# Patient Record
Sex: MALE | Race: BLACK OR AFRICAN AMERICAN | URBAN - METROPOLITAN AREA
[De-identification: names, ages, dates, MRNs, and addresses within clinical notes are randomized per-mention and may not be internally consistent; named-entity substitution may affect disease eponyms.]

---

## 2018-03-27 ENCOUNTER — OFFICE VISIT (OUTPATIENT)
Dept: INTERNAL MEDICINE CLINIC | Age: 23
End: 2018-03-27

## 2018-03-27 VITALS
BODY MASS INDEX: 31.58 KG/M2 | HEIGHT: 67 IN | SYSTOLIC BLOOD PRESSURE: 130 MMHG | WEIGHT: 201.2 LBS | TEMPERATURE: 98.3 F | OXYGEN SATURATION: 98 % | RESPIRATION RATE: 16 BRPM | DIASTOLIC BLOOD PRESSURE: 76 MMHG | HEART RATE: 78 BPM

## 2018-03-27 DIAGNOSIS — Z00.00 ROUTINE MEDICAL EXAM: Primary | ICD-10-CM

## 2018-03-27 DIAGNOSIS — G89.29 CHRONIC MIDLINE LOW BACK PAIN WITHOUT SCIATICA: ICD-10-CM

## 2018-03-27 DIAGNOSIS — J30.9 ALLERGIC RHINITIS, UNSPECIFIED CHRONICITY, UNSPECIFIED SEASONALITY, UNSPECIFIED TRIGGER: ICD-10-CM

## 2018-03-27 DIAGNOSIS — R19.7 DIARRHEA, UNSPECIFIED TYPE: ICD-10-CM

## 2018-03-27 DIAGNOSIS — J45.909 UNCOMPLICATED ASTHMA, UNSPECIFIED ASTHMA SEVERITY, UNSPECIFIED WHETHER PERSISTENT: ICD-10-CM

## 2018-03-27 DIAGNOSIS — M54.50 CHRONIC MIDLINE LOW BACK PAIN WITHOUT SCIATICA: ICD-10-CM

## 2018-03-27 NOTE — MR AVS SNAPSHOT
[address] [place] [address] E Debora [address]radege [address] 
[phone] Patient: Vivek Terrazas MRN: [id number] :1995 Visit Information Date & Time Provider Department Dept. Phone Encounter #  
 3/27/2018  3:00 PM Rafael Garcia, [address] [place] [address] and Internal Medicine [phone] [id number] Follow-up Instructions Return in about 2 months (around 2018) for abdomen and back pain. Upcoming Health Maintenance Date Due Influenza Age 5 to Adult 2017 Pneumococcal 19-64 Medium Risk (1 of 1 - PPSV23) 3/27/2058* DTaP/Tdap/Td series (2 - Td) 2025 *Topic was postponed. The date shown is not the original due date. Allergies as of 3/27/2018  Review Complete On: 3/27/2018 By: Rafael Garcia MD  
 No Known Allergies Current Immunizations  Reviewed on 2015 Name Date Influenza Vaccine (Quad) PF 10/29/2013 Tdap 2015 Not reviewed this visit You Were Diagnosed With   
  
 Codes Comments Routine medical exam    -  Primary ICD-10-CM: Z00.00 ICD-9-CM: V70.0 Allergic rhinitis, unspecified chronicity, unspecified seasonality, unspecified trigger     ICD-10-CM: J30.9 ICD-9-CM: 477.9 Uncomplicated asthma, unspecified asthma severity, unspecified whether persistent     ICD-10-CM: J45.909 ICD-9-CM: 493.90 Diarrhea, unspecified type     ICD-10-CM: R19.7 ICD-9-CM: 787.91 Chronic midline low back pain without sciatica     ICD-10-CM: M54.5, G89.29 ICD-9-CM: 724.2, 338.29 Vitals BP Pulse Temp Resp Height(growth percentile) Weight(growth percentile) 130/76 78 98.3 °F (36.8 °C) (Oral) 16 5' 7\" (1.702 m) 201 lb 3.2 oz (91.3 kg) SpO2 BMI Smoking Status 98% 31.51 kg/m2 Current Some Day Smoker Vitals History BMI and BSA Data Body Mass Index Body Surface Area  
 31.51 kg/m 2 2.08 m 2 Preferred Pharmacy Pharmacy Name Phone [id number] Loki [address] [place] [address] [phone] Your Updated Medication List  
  
   
This list is accurate as of 3/27/18  4:35 PM.  Always use your most recent med list.  
  
  
  
  
 * albuterol 90 mcg/actuation inhaler Commonly known as:  PROAIR HFA Take 2 Puffs by inhalation every four (4) hours as needed for Wheezing. * albuterol 90 mcg/actuation inhaler Commonly known as:  PROAIR HFA Take 2 Puffs by inhalation every four (4) hours as needed for Wheezing. ALLEGRA-D 24 HOUR 180-240 mg per tablet Generic drug:  fexofenadine-pseudoephedrine Take 1 Tab by mouth daily. budesonide-formoterol 80-4.5 mcg/actuation Hfaa Commonly known as:  SYMBICORT Take 2 Puffs by inhalation two (2) times a day. fluticasone 50 mcg/actuation nasal spray Commonly known as:  Manuel [place] 2 Sprays by Nasal route daily. Administer to right and left nostril. inhalational spacing device Commonly known as:  AEROCHAMBER MV  
1 Each by Does Not Apply route as needed. * Notice: This list has 2 medication(s) that are the same as other medications prescribed for you. Read the directions carefully, and ask your doctor or other care provider to review them with you. Follow-up Instructions Return in about 2 months (around 5/27/2018) for abdomen and back pain. To-Do List   
 03/29/2018 Lab:  C REACTIVE PROTEIN, QT   
  
 03/29/2018 Lab:  CBC WITH AUTOMATED DIFF   
  
 03/29/2018 Lab:  CELIAC ANTIBODY PROFILE   
  
 03/29/2018 Lab:  LIPID PANEL   
  
 03/29/2018 Lab:  METABOLIC PANEL, COMPREHENSIVE   
  
 03/29/2018 Lab:  SED RATE (ESR)   
  
 03/29/2018 Lab:  T4, FREE   
  
 03/29/2018 Lab:  TSH 3RD GENERATION   
  
 03/29/2018 Lab:  VITAMIN B12   
  
 03/29/2018 Lab:  VITAMIN D, 25 HYDROXY   
  
 03/29/2018   Imaging:  XR SPINE LUMB 2 OR 3 V   
  
  
 Introducing [facility]! Dear Amy Rolle: Thank you for requesting a [employer] account. Our records indicate that you already have an active [employer] account. You can access your account anytime at [email or web address]. [email or web address] Did you know that you can access your hospital and ER discharge instructions at any time in [employer]? You can also review all of your test results from your hospital stay or ER visit. Additional Information If you have questions, please visit the Frequently Asked Questions section of the [employer] website at [email or web address] Remember, [employer] is NOT to be used for urgent needs. For medical emergencies, dial 911. Now available from your iPhone and Android! Please provide this summary of care documentation to your next provider. Your primary care clinician is listed as [id number] If you have any questions after today's visit, please call [phone].

## 2018-03-27 NOTE — PROGRESS NOTES
Bowen Pierre is a 20 y.o. male    Room 15    Chief Complaint   Patient presents with    Complete Physical     1. Have you been to the ER, urgent care clinic since your last visit? Hospitalized since your last visit? Yes [facility] for lab work for trichomonas. Negative pt believes. 2. Have you seen or consulted any other health care providers outside of the [facility] since your last visit? Include any pap smears or colon screening.  No    Learning Assessment 6/25/2015   PRIMARY LEARNER Patient   BARRIERS PRIMARY LEARNER NONE   PRIMARY LANGUAGE ENGLISH   LEARNER PREFERENCE PRIMARY DEMONSTRATION   ANSWERED BY Brenda Nix    RELATIONSHIP SELF

## 2018-03-27 NOTE — PROGRESS NOTES
Subjective:   Osei Pollard is a 20 y.o. male presenting for his annual checkup. ROS:  Feeling well. No dyspnea or chest pain on exertion. No urinary tract or prostatic symptoms. No neurological complaints. Specific concerns today:   Pt c/o intermittent low back pain and lower abd pain/pressure    No clear triggers, but pt is concerned it could be related to his diet - frequent fast foods  Pt acknowledges drinking etoh regularly - 5 x per week with 5-6 drinks per night. Has stopped drinking x 9-10 days now. No blood or mucus in stools  No fever, chills  +wt gain     BMs not watery or diarrhea, but 3-4 BMs per day regularly - after each meal.  Not clearly related to any specific food type    Pt acknowledges some anxiety  No other limitations from anxiety though. Functions well at work    Pt started using apple cider vinegar in the past 7-10 days    Increased exercise in the past week. Past medical, Social, and Family history reviewed  Medications reviewed and updated. Objective:     Visit Vitals    /62 (BP 1 Location: Left arm, BP Patient Position: Sitting)    Pulse 78    Temp 98.3 °F (36.8 °C) (Oral)    Resp 16    Ht 5' 7\" (1.702 m)    Wt 201 lb 3.2 oz (91.3 kg)    SpO2 98%    BMI 31.51 kg/m2     The patient appears well, alert, oriented x 3, in no distress. ENT normal.  Neck supple. No adenopathy or thyromegaly. PREETHI. Lungs are clear, good air entry, no wheezes, rhonchi or rales. S1 and S2 normal, no murmurs, regular rate and rhythm. Abdomen is soft without tenderness, guarding, mass or organomegaly.  exam: no penile lesions or discharge, no testicular masses or tenderness, no hernias. Extremities show no edema, normal peripheral pulses. Neurological is normal without focal findings. Prior labs reviewed. Assessment/Plan:   healthy adult male  follow low fat diet, routine labs ordered, call if any problems. ICD-10-CM ICD-9-CM    1.  Routine medical exam Z00.00 V70.0 CBC WITH AUTOMATED DIFF      LIPID PANEL      METABOLIC PANEL, COMPREHENSIVE      VITAMIN D, 25 HYDROXY      VITAMIN B12   2. Allergic rhinitis, unspecified chronicity, unspecified seasonality, unspecified trigger J30.9 477.9    3. Uncomplicated asthma, unspecified asthma severity, unspecified whether persistent J45.909 493.90    4. Diarrhea, unspecified type R19.7 787.91 SED RATE (ESR)      T4, FREE      TSH 3RD GENERATION      C REACTIVE PROTEIN, QT      CELIAC ANTIBODY PROFILE   5. Chronic midline low back pain without sciatica M54.5 724.2 XR SPINE LUMB 2 OR 3 V    G89.29 338.29      Follow-up Disposition:  Return in about 2 months (around 5/27/2018) for abdomen and back pain. results and schedule of future studies reviewed with patient  reviewed diet, exercise and weight   cardiovascular risk and specific lipid/LDL goals reviewed  reviewed medications and side effects in detail.   Return for labs  Back Xrays  Improve diet, increase fiber  Consider imaging, etc pending labs and response to diet changes

## 2023-05-20 RX ORDER — FEXOFENADINE HCL AND PSEUDOEPHEDRINE HCI 180; 240 MG/1; MG/1
1 TABLET, EXTENDED RELEASE ORAL DAILY
COMMUNITY

## 2023-05-20 RX ORDER — FLUTICASONE PROPIONATE 50 MCG
2 SPRAY, SUSPENSION (ML) NASAL DAILY
COMMUNITY
Start: 2011-09-20

## 2023-05-20 RX ORDER — BUDESONIDE AND FORMOTEROL FUMARATE DIHYDRATE 80; 4.5 UG/1; UG/1
2 AEROSOL RESPIRATORY (INHALATION) 2 TIMES DAILY
COMMUNITY
Start: 2015-06-25

## 2023-05-20 RX ORDER — ALBUTEROL SULFATE 90 UG/1
2 AEROSOL, METERED RESPIRATORY (INHALATION) EVERY 4 HOURS PRN
COMMUNITY
Start: 2016-01-05